# Patient Record
Sex: FEMALE | Race: WHITE | Employment: FULL TIME | URBAN - METROPOLITAN AREA
[De-identification: names, ages, dates, MRNs, and addresses within clinical notes are randomized per-mention and may not be internally consistent; named-entity substitution may affect disease eponyms.]

---

## 2017-07-03 ENCOUNTER — HOSPITAL ENCOUNTER (OUTPATIENT)
Dept: CT IMAGING | Age: 22
Discharge: HOME | End: 2017-07-03
Attending: FAMILY MEDICINE | Admitting: FAMILY MEDICINE

## 2017-07-03 DIAGNOSIS — R59.9 ENLARGEMENT OF LYMPH NODES: ICD-10-CM

## 2017-07-03 RX ORDER — SODIUM CHLORIDE 0.9 % (FLUSH) 0.9 %
10 SYRINGE (ML) INJECTION 2 TIMES DAILY
Status: DISCONTINUED | OUTPATIENT
Start: 2017-07-03 | End: 2017-07-04 | Stop reason: HOSPADM

## 2017-07-03 RX ADMIN — Medication 10 ML: at 15:33

## 2017-08-15 ENCOUNTER — HOSPITAL ENCOUNTER (OUTPATIENT)
Dept: LAB | Age: 22
Discharge: HOME | End: 2017-08-15
Attending: OTOLARYNGOLOGY | Admitting: OTOLARYNGOLOGY

## 2017-08-15 LAB
BASOPHILS ABSOLUTE: 0 K/CU MM
BASOPHILS RELATIVE PERCENT: 0.6 % (ref 0–1)
DIFFERENTIAL TYPE: ABNORMAL
EOSINOPHILS ABSOLUTE: 0.1 K/CU MM
EOSINOPHILS RELATIVE PERCENT: 1.8 % (ref 0–3)
HCT VFR BLD CALC: 42 % (ref 37–47)
HEMOGLOBIN: 13.9 GM/DL (ref 12.5–16)
HETEROPHILE ANTIBODIES: NEGATIVE
IMMATURE NEUTROPHIL %: 0.1 % (ref 0–0.43)
LYMPHOCYTES ABSOLUTE: 3.5 K/CU MM
LYMPHOCYTES RELATIVE PERCENT: 47.4 % (ref 24–44)
MCH RBC QN AUTO: 30.3 PG (ref 27–31)
MCHC RBC AUTO-ENTMCNC: 33.1 % (ref 32–36)
MCV RBC AUTO: 91.7 FL (ref 78–100)
MONOCYTES ABSOLUTE: 0.5 K/CU MM
MONOCYTES RELATIVE PERCENT: 6.8 % (ref 0–4)
PDW BLD-RTO: 12.5 % (ref 11.7–14.9)
PLATELET # BLD: 157 K/CU MM (ref 140–440)
PMV BLD AUTO: 9.4 FL (ref 7.5–11.1)
RBC # BLD: 4.58 M/CU MM (ref 4.2–5.4)
SEGMENTED NEUTROPHILS ABSOLUTE COUNT: 3.1 K/CU MM
SEGMENTED NEUTROPHILS RELATIVE PERCENT: 43.3 % (ref 36–66)
TOTAL IMMATURE NEUTOROPHIL: 0.01 K/CU MM
WBC # BLD: 7.2 K/CU MM (ref 4–10.5)
WBC # BLD: ABNORMAL 10*3/UL

## 2017-08-17 LAB
CMV IGM: 105
CYTOMEGALOVIRUS IGG ANTIBODY: 0.35

## 2019-03-28 ENCOUNTER — ANESTHESIA EVENT (OUTPATIENT)
Dept: LABOR AND DELIVERY | Age: 24
End: 2019-03-28
Payer: COMMERCIAL

## 2019-03-28 ENCOUNTER — ANESTHESIA (OUTPATIENT)
Dept: LABOR AND DELIVERY | Age: 24
End: 2019-03-28
Payer: COMMERCIAL

## 2019-03-28 ENCOUNTER — HOSPITAL ENCOUNTER (INPATIENT)
Age: 24
LOS: 2 days | Discharge: HOME | End: 2019-03-30
Attending: OBSTETRICS & GYNECOLOGY | Admitting: OBSTETRICS & GYNECOLOGY
Payer: COMMERCIAL

## 2019-03-28 PROBLEM — Z34.83 NORMAL PREGNANCY IN MULTIGRAVIDA IN THIRD TRIMESTER: Status: ACTIVE | Noted: 2019-03-28

## 2019-03-28 PROBLEM — Z34.90 TERM PREGNANCY: Status: ACTIVE | Noted: 2019-03-28

## 2019-03-28 LAB
ABO/RH: NORMAL
AMPHETAMINES: NEGATIVE
ANTIBODY SCREEN: NEGATIVE
BACTERIA: ABNORMAL /HPF
BARBITURATE SCREEN URINE: NEGATIVE
BENZODIAZEPINE SCREEN, URINE: NEGATIVE
BILIRUBIN URINE: NEGATIVE MG/DL
BLOOD, URINE: NEGATIVE
CANNABINOID SCREEN URINE: NEGATIVE
CLARITY: ABNORMAL
COCAINE METABOLITE: NEGATIVE
COLOR: YELLOW
GLUCOSE, URINE: NEGATIVE MG/DL
HCT VFR BLD CALC: 36.1 % (ref 37–47)
HEMOGLOBIN: 11.3 GM/DL (ref 12.5–16)
KETONES, URINE: NEGATIVE MG/DL
LEUKOCYTE ESTERASE, URINE: NEGATIVE
MCH RBC QN AUTO: 28.7 PG (ref 27–31)
MCHC RBC AUTO-ENTMCNC: 31.3 % (ref 32–36)
MCV RBC AUTO: 91.6 FL (ref 78–100)
MUCUS: ABNORMAL HPF
NITRITE URINE, QUANTITATIVE: NEGATIVE
OPIATES, URINE: NEGATIVE
OXYCODONE: NORMAL
PDW BLD-RTO: 12.9 % (ref 11.7–14.9)
PH, URINE: 5 (ref 5–8)
PHENCYCLIDINE, URINE: NEGATIVE
PLATELET # BLD: 224 K/CU MM (ref 140–440)
PMV BLD AUTO: 10 FL (ref 7.5–11.1)
PROTEIN UA: 30 MG/DL
RBC # BLD: 3.94 M/CU MM (ref 4.2–5.4)
RBC URINE: 1 /HPF (ref 0–6)
SPECIFIC GRAVITY UA: 1.02 (ref 1–1.03)
SQUAMOUS EPITHELIAL: 51 /HPF
TRICHOMONAS: ABNORMAL /HPF
UROBILINOGEN, URINE: 1 MG/DL (ref 0.2–1)
WBC # BLD: 9.5 K/CU MM (ref 4–10.5)
WBC UA: 1 /HPF (ref 0–5)

## 2019-03-28 PROCEDURE — 6360000002 HC RX W HCPCS: Performed by: ANESTHESIOLOGY

## 2019-03-28 PROCEDURE — 86850 RBC ANTIBODY SCREEN: CPT

## 2019-03-28 PROCEDURE — 3700000025 EPIDURAL BLOCK: Performed by: NURSE ANESTHETIST, CERTIFIED REGISTERED

## 2019-03-28 PROCEDURE — 2580000003 HC RX 258: Performed by: OBSTETRICS & GYNECOLOGY

## 2019-03-28 PROCEDURE — 3E033VJ INTRODUCTION OF OTHER HORMONE INTO PERIPHERAL VEIN, PERCUTANEOUS APPROACH: ICD-10-PCS | Performed by: OBSTETRICS & GYNECOLOGY

## 2019-03-28 PROCEDURE — 10907ZC DRAINAGE OF AMNIOTIC FLUID, THERAPEUTIC FROM PRODUCTS OF CONCEPTION, VIA NATURAL OR ARTIFICIAL OPENING: ICD-10-PCS | Performed by: OBSTETRICS & GYNECOLOGY

## 2019-03-28 PROCEDURE — 86901 BLOOD TYPING SEROLOGIC RH(D): CPT

## 2019-03-28 PROCEDURE — 6370000000 HC RX 637 (ALT 250 FOR IP): Performed by: OBSTETRICS & GYNECOLOGY

## 2019-03-28 PROCEDURE — 0HQ9XZZ REPAIR PERINEUM SKIN, EXTERNAL APPROACH: ICD-10-PCS | Performed by: OBSTETRICS & GYNECOLOGY

## 2019-03-28 PROCEDURE — 7200000001 HC VAGINAL DELIVERY

## 2019-03-28 PROCEDURE — 6360000002 HC RX W HCPCS

## 2019-03-28 PROCEDURE — 1220000000 HC SEMI PRIVATE OB R&B

## 2019-03-28 PROCEDURE — 51702 INSERT TEMP BLADDER CATH: CPT

## 2019-03-28 PROCEDURE — 81001 URINALYSIS AUTO W/SCOPE: CPT

## 2019-03-28 PROCEDURE — 80307 DRUG TEST PRSMV CHEM ANLYZR: CPT

## 2019-03-28 PROCEDURE — 85027 COMPLETE CBC AUTOMATED: CPT

## 2019-03-28 PROCEDURE — 6360000002 HC RX W HCPCS: Performed by: NURSE ANESTHETIST, CERTIFIED REGISTERED

## 2019-03-28 PROCEDURE — 2580000003 HC RX 258

## 2019-03-28 PROCEDURE — 2500000003 HC RX 250 WO HCPCS: Performed by: NURSE ANESTHETIST, CERTIFIED REGISTERED

## 2019-03-28 PROCEDURE — 86900 BLOOD TYPING SEROLOGIC ABO: CPT

## 2019-03-28 RX ORDER — SODIUM CHLORIDE 0.9 % (FLUSH) 0.9 %
10 SYRINGE (ML) INJECTION EVERY 12 HOURS SCHEDULED
Status: DISCONTINUED | OUTPATIENT
Start: 2019-03-28 | End: 2019-03-30 | Stop reason: HOSPADM

## 2019-03-28 RX ORDER — SODIUM CHLORIDE, SODIUM LACTATE, POTASSIUM CHLORIDE, CALCIUM CHLORIDE 600; 310; 30; 20 MG/100ML; MG/100ML; MG/100ML; MG/100ML
INJECTION, SOLUTION INTRAVENOUS CONTINUOUS
Status: DISCONTINUED | OUTPATIENT
Start: 2019-03-28 | End: 2019-03-28

## 2019-03-28 RX ORDER — ROPIVACAINE HYDROCHLORIDE 2 MG/ML
INJECTION, SOLUTION EPIDURAL; INFILTRATION; PERINEURAL CONTINUOUS PRN
Status: DISCONTINUED | OUTPATIENT
Start: 2019-03-28 | End: 2019-03-28 | Stop reason: SDUPTHER

## 2019-03-28 RX ORDER — SODIUM CHLORIDE 0.9 % (FLUSH) 0.9 %
10 SYRINGE (ML) INJECTION PRN
Status: DISCONTINUED | OUTPATIENT
Start: 2019-03-28 | End: 2019-03-28 | Stop reason: HOSPADM

## 2019-03-28 RX ORDER — HYDROCODONE BITARTRATE AND ACETAMINOPHEN 5; 325 MG/1; MG/1
1 TABLET ORAL EVERY 4 HOURS PRN
Status: DISCONTINUED | OUTPATIENT
Start: 2019-03-28 | End: 2019-03-30 | Stop reason: HOSPADM

## 2019-03-28 RX ORDER — IBUPROFEN 800 MG/1
800 TABLET ORAL EVERY 8 HOURS
Status: DISCONTINUED | OUTPATIENT
Start: 2019-03-28 | End: 2019-03-30 | Stop reason: HOSPADM

## 2019-03-28 RX ORDER — HYDROCODONE BITARTRATE AND ACETAMINOPHEN 5; 325 MG/1; MG/1
2 TABLET ORAL EVERY 4 HOURS PRN
Status: DISCONTINUED | OUTPATIENT
Start: 2019-03-28 | End: 2019-03-30 | Stop reason: HOSPADM

## 2019-03-28 RX ORDER — ACETAMINOPHEN 325 MG/1
650 TABLET ORAL EVERY 4 HOURS PRN
Status: DISCONTINUED | OUTPATIENT
Start: 2019-03-28 | End: 2019-03-30 | Stop reason: HOSPADM

## 2019-03-28 RX ORDER — ONDANSETRON 2 MG/ML
4 INJECTION INTRAMUSCULAR; INTRAVENOUS EVERY 6 HOURS PRN
Status: DISCONTINUED | OUTPATIENT
Start: 2019-03-28 | End: 2019-03-30 | Stop reason: HOSPADM

## 2019-03-28 RX ORDER — SODIUM CHLORIDE, SODIUM LACTATE, POTASSIUM CHLORIDE, CALCIUM CHLORIDE 600; 310; 30; 20 MG/100ML; MG/100ML; MG/100ML; MG/100ML
INJECTION, SOLUTION INTRAVENOUS
Status: COMPLETED
Start: 2019-03-28 | End: 2019-03-28

## 2019-03-28 RX ORDER — LANOLIN 100 %
OINTMENT (GRAM) TOPICAL PRN
Status: DISCONTINUED | OUTPATIENT
Start: 2019-03-28 | End: 2019-03-30 | Stop reason: HOSPADM

## 2019-03-28 RX ORDER — ONDANSETRON 2 MG/ML
4 INJECTION INTRAMUSCULAR; INTRAVENOUS EVERY 6 HOURS PRN
Status: DISCONTINUED | OUTPATIENT
Start: 2019-03-28 | End: 2019-03-28 | Stop reason: HOSPADM

## 2019-03-28 RX ORDER — ONDANSETRON 4 MG/1
4 TABLET, ORALLY DISINTEGRATING ORAL EVERY 8 HOURS PRN
Status: DISCONTINUED | OUTPATIENT
Start: 2019-03-28 | End: 2019-03-30 | Stop reason: HOSPADM

## 2019-03-28 RX ORDER — FAMOTIDINE 20 MG/1
20 TABLET, FILM COATED ORAL 2 TIMES DAILY PRN
Status: DISCONTINUED | OUTPATIENT
Start: 2019-03-28 | End: 2019-03-30 | Stop reason: HOSPADM

## 2019-03-28 RX ORDER — DOCUSATE SODIUM 100 MG/1
100 CAPSULE, LIQUID FILLED ORAL 2 TIMES DAILY
Status: DISCONTINUED | OUTPATIENT
Start: 2019-03-29 | End: 2019-03-30 | Stop reason: HOSPADM

## 2019-03-28 RX ORDER — LIDOCAINE HYDROCHLORIDE AND EPINEPHRINE 15; 5 MG/ML; UG/ML
INJECTION, SOLUTION EPIDURAL PRN
Status: DISCONTINUED | OUTPATIENT
Start: 2019-03-28 | End: 2019-03-28 | Stop reason: SDUPTHER

## 2019-03-28 RX ORDER — SODIUM CHLORIDE 0.9 % (FLUSH) 0.9 %
10 SYRINGE (ML) INJECTION PRN
Status: DISCONTINUED | OUTPATIENT
Start: 2019-03-28 | End: 2019-03-30 | Stop reason: HOSPADM

## 2019-03-28 RX ORDER — NALOXONE HYDROCHLORIDE 0.4 MG/ML
0.4 INJECTION, SOLUTION INTRAMUSCULAR; INTRAVENOUS; SUBCUTANEOUS PRN
Status: DISCONTINUED | OUTPATIENT
Start: 2019-03-28 | End: 2019-03-28

## 2019-03-28 RX ORDER — ROPIVACAINE HYDROCHLORIDE 2 MG/ML
INJECTION, SOLUTION EPIDURAL; INFILTRATION; PERINEURAL PRN
Status: DISCONTINUED | OUTPATIENT
Start: 2019-03-28 | End: 2019-03-28 | Stop reason: SDUPTHER

## 2019-03-28 RX ORDER — FENTANYL CITRATE 50 UG/ML
100 INJECTION, SOLUTION INTRAMUSCULAR; INTRAVENOUS
Status: DISCONTINUED | OUTPATIENT
Start: 2019-03-28 | End: 2019-03-28 | Stop reason: HOSPADM

## 2019-03-28 RX ORDER — SODIUM CHLORIDE 0.9 % (FLUSH) 0.9 %
10 SYRINGE (ML) INJECTION EVERY 12 HOURS SCHEDULED
Status: DISCONTINUED | OUTPATIENT
Start: 2019-03-28 | End: 2019-03-28

## 2019-03-28 RX ORDER — ROPIVACAINE HYDROCHLORIDE 2 MG/ML
10 INJECTION, SOLUTION EPIDURAL; INFILTRATION; PERINEURAL CONTINUOUS
Status: DISCONTINUED | OUTPATIENT
Start: 2019-03-28 | End: 2019-03-28

## 2019-03-28 RX ORDER — TERBUTALINE SULFATE 1 MG/ML
0.25 INJECTION, SOLUTION SUBCUTANEOUS ONCE
Status: DISCONTINUED | OUTPATIENT
Start: 2019-03-28 | End: 2019-03-28 | Stop reason: HOSPADM

## 2019-03-28 RX ADMIN — ROPIVACAINE HYDROCHLORIDE 10 ML/HR: 2 INJECTION, SOLUTION EPIDURAL; INFILTRATION at 15:32

## 2019-03-28 RX ADMIN — Medication 1 MILLI-UNITS/MIN: at 10:07

## 2019-03-28 RX ADMIN — ROPIVACAINE HYDROCHLORIDE 10 ML/HR: 2 INJECTION, SOLUTION EPIDURAL; INFILTRATION at 15:36

## 2019-03-28 RX ADMIN — IBUPROFEN 800 MG: 800 TABLET ORAL at 19:30

## 2019-03-28 RX ADMIN — SODIUM CHLORIDE, POTASSIUM CHLORIDE, SODIUM LACTATE AND CALCIUM CHLORIDE: 600; 310; 30; 20 INJECTION, SOLUTION INTRAVENOUS at 09:47

## 2019-03-28 RX ADMIN — LIDOCAINE HYDROCHLORIDE,EPINEPHRINE BITARTRATE 3 ML: 15; .005 INJECTION, SOLUTION EPIDURAL; INFILTRATION; INTRACAUDAL; PERINEURAL at 15:26

## 2019-03-28 RX ADMIN — ROPIVACAINE HYDROCHLORIDE 8 ML: 2 INJECTION, SOLUTION EPIDURAL; INFILTRATION at 15:29

## 2019-03-28 RX ADMIN — SODIUM CHLORIDE, POTASSIUM CHLORIDE, SODIUM LACTATE AND CALCIUM CHLORIDE: 600; 310; 30; 20 INJECTION, SOLUTION INTRAVENOUS at 15:22

## 2019-03-28 ASSESSMENT — PAIN DESCRIPTION - DESCRIPTORS
DESCRIPTORS: CRAMPING
DESCRIPTORS: PRESSURE
DESCRIPTORS: PRESSURE
DESCRIPTORS: CRAMPING

## 2019-03-28 ASSESSMENT — PAIN SCALES - GENERAL
PAINLEVEL_OUTOF10: 6
PAINLEVEL_OUTOF10: 0

## 2019-03-28 NOTE — L&D DELIVERY NOTE
Mother's Information    Labor Events    Rupture type:  Artificial=AROM  Fluid color:  Bloody Show  Fluid odor:  None     Mother Delivery Information    Surgical or Additional Est. Blood Loss (mL):  0 (View Only):  Edit in Flowsheets   Combined Est. Blood Loss (mL):  0        Hild, Baby Pending  [employer] [[id number]]    Labor Events    Cervical ripening date/time:     Rupture date/time: 3/28/19 16:12:00   Rupture type:  Artificial=AROM  Fluid color:  Bloody Show          Labor Event Times    Labor onset date/time:     Dilation complete date/time:   3/28/19 164 EDT   Start pushing:    Decision time (emergent ):       Delivery Providers    Delivering clinician:        Measurements       Delivery Information    Surgical or additional est. blood loss (mL):  0 (View Only):  Edit in Flowsheets   Combined est. blood loss (mL):  0     Vaginal Delivery Counts    Initial count personnel:  Oneal Sweeney RN  Initial count verified by:  JIGNA PHILLIPS RN   4x4:   Needles:   Instruments:   Lap Pads:   Sponges:     Initial counts:          Final counts:                  Department of Obstetrics and Gynecology  Spontaneous Vaginal Delivery Note    Labor & Delivery Summary  Dilation Complete Date: 19  Dilation Complete Time:     Pre-operative Diagnosis:  Term pregnancy    Post-operative Diagnosis:  Same + live male wt8 #,14 oz  Procedure:  Spontaneous vaginal delivery    Surgeon:  [address] [place] [address] for the patient's :  Fe Baby Pending  [employer] [[id number]]          Anesthesia:  epidural anesthesia    Estimated blood loss:  250 mL    Specimen:  Placenta not sent to pathology     Cord blood sent No    Complications:  none    Condition:  infant stable to general nursery and mother stable    Details of Procedure:    The patient is a 25 y.o. female at 38w11d   OB History        2    Para   1    Term   1       0    AB   0    Living   1       SAB   0    TAB   0    Ectopic   0    Molar Multiple   0    Live Births   1             who was admitted for induction. She received the following interventions: ARBOW and IV Pitocin induction She was known to be GBS negative and did not receive antibiotic prophylaxis. The patient progressed well,did receive an epidural, became complete and started to push. After pushing for 40 misn the fetal head was at the perineum, nose and mouth suctioned with bulb suction and the rest of the infant delivered atraumatically, placed on mother abdomen. Cord was clamped and cut and infant handed off to the waiting nurse for evaluation. The delivery of the placenta was spontaneous. The perineum and vagina were explored and a second degree laceration was repaired in standard fashion. Infant's name is Andrew Bloom.

## 2019-03-28 NOTE — H&P
Department of Obstetrics and Gynecology   Obstetrics History and Physical        CHIEF COMPLAINT:   Chief Complaint   Patient presents with    Scheduled Induction         HISTORY OF PRESENT ILLNESS:      The patient is a 25 y.o. female at 38w11d. OB History        2    Para   1    Term   1       0    AB   0    Living   1       SAB   0    TAB   0    Ectopic   0    Molar        Multiple   0    Live Births   1            Patient presents with a chief complaint as above and is being admitted for induction    Estimated Due Date: Estimated Date of Delivery: 3/30/19    PRENATAL CARE:    Complicated by: PUPPS    PAST OB HISTORY  OB History        2    Para   1    Term   1       0    AB   0    Living   1       SAB   0    TAB   0    Ectopic   0    Molar        Multiple   0    Live Births   1                Past Medical History:        Diagnosis Date    Asthma     Hypertension     pregnancy induced    Migraines      Past Surgical History:        Procedure Laterality Date    TONSILLECTOMY      TONSILLECTOMY       Allergies:  Patient has no known allergies.   Social History:    Social History     Socioeconomic History    Marital status:      Spouse name: Not on file    Number of children: Not on file    Years of education: Not on file    Highest education level: Not on file   Occupational History    Not on file   Social Needs    Financial resource strain: Not on file    Food insecurity:     Worry: Not on file     Inability: Not on file    Transportation needs:     Medical: Not on file     Non-medical: Not on file   Tobacco Use    Smoking status: Never Smoker   Substance and Sexual Activity    Alcohol use: No    Drug use: No    Sexual activity: Yes     Partners: Male   Lifestyle    Physical activity:     Days per week: Not on file     Minutes per session: Not on file    Stress: Not on file   Relationships    Social connections:     Talks on phone: Not on file     Gets together: Not on file     Attends Anabaptist service: Not on file     Active member of club or organization: Not on file     Attends meetings of clubs or organizations: Not on file     Relationship status: Not on file    Intimate partner violence:     Fear of current or ex partner: Not on file     Emotionally abused: Not on file     Physically abused: Not on file     Forced sexual activity: Not on file   Other Topics Concern    Not on file   Social History Narrative    Not on file     Family History:       Problem Relation Age of Onset    Birth Defects Brother     Heart Disease Maternal Grandfather     High Blood Pressure Mother     High Cholesterol Mother    Alyssa Hurtado / Amira Mother     Cancer Maternal Grandmother     Miscarriages / Stillbirths Maternal Grandmother      Medications Prior to Admission:  Medications Prior to Admission: ibuprofen (ADVIL;MOTRIN) 800 MG tablet, Take 1 tablet by mouth every 8 hours  furosemide (LASIX) 20 MG tablet, Take 1 tablet by mouth daily as needed  Prenatal Vit-DSS-Fe Cbn-FA (PRENATAL AD) TABS, Take by mouth  FOLIC ACID PO, Take by mouth    REVIEW OF SYSTEMS:    CONSTITUTIONAL:  negative  RESPIRATORY:  negative  CARDIOVASCULAR:  negative  GASTROINTESTINAL:  negative  ALLERGIC/IMMUNOLOGIC:  negative  NEUROLOGICAL:  negative  BEHAVIOR/PSYCH:  negative    PHYSICAL EXAM:  Blood pressure 129/75, pulse 78, temperature 96.9 °F (36.1 °C), temperature source Temporal, resp. rate 16, unknown if currently breastfeeding. General appearance:  awake, alert, cooperative, no apparent distress, and appears stated age  Neurologic:  Awake, alert, oriented to name, place and time.     Lungs:  No increased work of breathing, good air exchange  Abdomen:  Soft, non tender, gravid, consistent with her gestational age,   Fetal heart rate:    Baseline Heart Rate:  145        Accelerations:  absent       Long Term Variability:  moderate       Decelerations:  absent       Pelvis:  Adequate pelvis  Cervix: 3/50/-2 in office yesterday    Contraction frequency:  Not cici    Membranes:  Intact    ASSESSMENT AND PLAN:    Labor: Admit, anticipate normal delivery, routine labor orders  Fetus: Reassuring  GBS:negative  Other: pitocin, epidural prn. Will likely AROM for augmentation of labor.      Benjamin Jean

## 2019-03-29 PROCEDURE — 90715 TDAP VACCINE 7 YRS/> IM: CPT | Performed by: OBSTETRICS & GYNECOLOGY

## 2019-03-29 PROCEDURE — 6360000002 HC RX W HCPCS: Performed by: OBSTETRICS & GYNECOLOGY

## 2019-03-29 PROCEDURE — 6370000000 HC RX 637 (ALT 250 FOR IP): Performed by: OBSTETRICS & GYNECOLOGY

## 2019-03-29 PROCEDURE — 1220000000 HC SEMI PRIVATE OB R&B

## 2019-03-29 PROCEDURE — 90471 IMMUNIZATION ADMIN: CPT | Performed by: OBSTETRICS & GYNECOLOGY

## 2019-03-29 RX ADMIN — TETANUS TOXOID, REDUCED DIPHTHERIA TOXOID AND ACELLULAR PERTUSSIS VACCINE, ADSORBED 0.5 ML: 5; 2.5; 8; 8; 2.5 SUSPENSION INTRAMUSCULAR at 08:21

## 2019-03-29 RX ADMIN — DOCUSATE SODIUM 100 MG: 100 CAPSULE, LIQUID FILLED ORAL at 21:22

## 2019-03-29 RX ADMIN — IBUPROFEN 800 MG: 800 TABLET ORAL at 21:22

## 2019-03-29 RX ADMIN — DOCUSATE SODIUM 100 MG: 100 CAPSULE, LIQUID FILLED ORAL at 08:18

## 2019-03-29 RX ADMIN — IBUPROFEN 800 MG: 800 TABLET ORAL at 13:08

## 2019-03-29 RX ADMIN — IBUPROFEN 800 MG: 800 TABLET ORAL at 04:52

## 2019-03-29 ASSESSMENT — PAIN SCALES - GENERAL
PAINLEVEL_OUTOF10: 4
PAINLEVEL_OUTOF10: 2
PAINLEVEL_OUTOF10: 0
PAINLEVEL_OUTOF10: 0

## 2019-03-29 NOTE — PROGRESS NOTES
Patient up to bathroom with assistance. Nursing assistance. Gait steady. Patient reports left leg weakness. Patient unable to void at this time. Marissa care complete. Patient into wheelchair.

## 2019-03-29 NOTE — PROGRESS NOTES
Patient transferred to [place] [address]. Oriented to room and call light. Instructed not to get out of bed with out nursing assistance. Patient voices understanding and agreement.

## 2019-03-29 NOTE — LACTATION NOTE
Visited, Mom is breast feeding at present. Good positioning and suckling observed. Mom says she didn't have much milk with her first baby. Colostrum vs mature milk volumes discussed. I encourage her to awaken the baby and breast feed frequently, explaining that frequent nursing is important for optimal stimulation to build and maintain a good milk supply. Mom voices understanding. Mom has a new breast pump and says she knows how to use it. Milk storage guidelines discussed. Baby has nursed at the right breast, I offer to assist to continue the feeding, but Mom says she wants to eat her breakfast. Mom is encouraged to call anytime for assistance or concerns.  Bruce Cruz

## 2019-03-29 NOTE — PROGRESS NOTES
Subjective:     Postpartum Day 1:   The patient feels well. The patient denies emotional concerns. Pain is well controlled with current medications. The baby iswell. The patient is ambulating well. The patient is tolerating a normal diet. Objective:        Vitals:    03/29/19 0548   BP: 130/78   Pulse: 69   Resp: 16   Temp: 98.6 °F (37 °C)       Lab Results   Component Value Date    WBC 9.5 03/28/2019    HGB 11.3 (L) 03/28/2019    HCT 36.1 (L) 03/28/2019    MCV 91.6 03/28/2019     03/28/2019       General:    alert, appears stated age and cooperative       Lochia:  appropriate   Uterine    firm       DVT Evaluation:  No evidence of DVT seen on physical exam.     Assessment:     Postpartum day 1 Doing well post delivery. Plan:     Continue current care.     Wendy Llamas [facility] 3/29/2019 11:19 AM

## 2019-03-30 VITALS
TEMPERATURE: 98.2 F | DIASTOLIC BLOOD PRESSURE: 73 MMHG | RESPIRATION RATE: 16 BRPM | HEIGHT: 66 IN | HEART RATE: 71 BPM | WEIGHT: 182 LBS | SYSTOLIC BLOOD PRESSURE: 108 MMHG | BODY MASS INDEX: 29.25 KG/M2 | OXYGEN SATURATION: 98 %

## 2019-03-30 PROBLEM — Z34.83 NORMAL PREGNANCY IN MULTIGRAVIDA IN THIRD TRIMESTER: Status: RESOLVED | Noted: 2019-03-28 | Resolved: 2019-03-30

## 2019-03-30 PROBLEM — Z34.90 TERM PREGNANCY: Status: RESOLVED | Noted: 2019-03-28 | Resolved: 2019-03-30

## 2019-03-30 PROCEDURE — 6370000000 HC RX 637 (ALT 250 FOR IP): Performed by: OBSTETRICS & GYNECOLOGY

## 2019-03-30 RX ORDER — IBUPROFEN 800 MG/1
800 TABLET ORAL EVERY 8 HOURS
Qty: 120 TABLET | Refills: 3 | Status: SHIPPED | OUTPATIENT
Start: 2019-03-30

## 2019-03-30 RX ADMIN — IBUPROFEN 800 MG: 800 TABLET ORAL at 05:53

## 2019-03-30 RX ADMIN — IBUPROFEN 800 MG: 800 TABLET ORAL at 14:33

## 2019-03-30 RX ADMIN — DOCUSATE SODIUM 100 MG: 100 CAPSULE, LIQUID FILLED ORAL at 08:20

## 2019-03-30 ASSESSMENT — PAIN SCALES - GENERAL
PAINLEVEL_OUTOF10: 0
PAINLEVEL_OUTOF10: 0

## 2019-03-30 NOTE — PLAN OF CARE
Problem: VAGINAL DELIVERY - RECOVERY AND POST PARTUM  Goal: Vital signs are medically acceptable  Outcome: Met This Shift  Goal: Patient will remain free of falls  Outcome: Met This Shift  Goal: Fundus firm at midline  Outcome: Met This Shift  Goal: Moderate rubra without clots, no purulent discharge, no foul smelling lochia  Outcome: Met This Shift  Goal: Empties bladder  Outcome: Met This Shift  Goal: Verbalizes understanding of normal bowel function resumption  Outcome: Met This Shift  Goal: Edema will be absent or minimal  Outcome: Met This Shift  Goal: Breasts are soft with nipple integrity intact  Outcome: Met This Shift  Goal: Demonstrates appropriate breast feeding techniques  Outcome: Met This Shift  Goal: Appropriate behavior observed  Outcome: Met This Shift  Goal: Positive Mother-Baby interactions are observed  Outcome: Met This Shift  Goal: Perineum intact without discharge or hematoma  Outcome: Met This Shift  Goal: Ambulates independently  Outcome: Met This Shift     Problem: PAIN  Goal: Patient's pain/discomfort is manageable  Outcome: Met This Shift     Problem: KNOWLEDGE DEFICIT  Goal: Patient/S.O. demonstrates understanding of disease process, treatment plan, medications, and discharge instructions.   Outcome: Met This Shift

## 2019-03-30 NOTE — PROGRESS NOTES
Subjective:     Postpartum Day 2:   The patient feels well. The patient denies emotional concerns. Pain is well controlled with current medications. The baby iswell. The patient is ambulating well. The patient is tolerating a normal diet. Objective:        Vitals:    03/30/19 0553   BP: 125/67   Pulse: 61   Resp: 18   Temp: 96.4 °F (35.8 °C)   SpO2: 98%       Lab Results   Component Value Date    WBC 9.5 03/28/2019    HGB 11.3 (L) 03/28/2019    HCT 36.1 (L) 03/28/2019    MCV 91.6 03/28/2019     03/28/2019       General:    alert, appears stated age and cooperative       Lochia:  appropriate   Uterine    firm       DVT Evaluation:  No evidence of DVT seen on physical exam.     Assessment:     Postpartum day 2 Doing well post delivery. Plan:     Discharge home with standard precautions and return to clinic in 4-6 weeks.     Kb Moe 3/30/2019 9:56 AM

## 2019-03-30 NOTE — DISCHARGE SUMMARY
Obstetrical Discharge Form    Gestational Age:  38w11d    Antepartum complications: none    Date of Delivery:   3/28/19      Type of Delivery:   vaginal, spontaneous    Delivered By:  Jeannette Morejon               Baby:       Information for the patient's :  Jessy Shen [[id number]]        Anesthesia:    Epidural    Intrapartum complications: None    Feeding method:   breast    Postpartum complications: none    Discharge Date:  3/30/19     Condition of discharge:  excellent    Plan:   Follow up    in 6 week(s)

## 2021-12-06 ENCOUNTER — HOSPITAL ENCOUNTER (OUTPATIENT)
Age: 26
Discharge: HOME | End: 2021-12-06
Payer: COMMERCIAL

## 2021-12-06 LAB — HBV SURFACE AB TITR SER: <3.5 {TITER}

## 2021-12-06 PROCEDURE — 86706 HEP B SURFACE ANTIBODY: CPT

## 2021-12-06 PROCEDURE — 36415 COLL VENOUS BLD VENIPUNCTURE: CPT

## 2021-12-06 PROCEDURE — 86762 RUBELLA ANTIBODY: CPT

## 2021-12-06 PROCEDURE — 86765 RUBEOLA ANTIBODY: CPT

## 2021-12-06 PROCEDURE — 86735 MUMPS ANTIBODY: CPT

## 2021-12-06 PROCEDURE — 86787 VARICELLA-ZOSTER ANTIBODY: CPT

## 2021-12-09 LAB
MUV IGG SER QL: <5 AU/ML
RUBELLA ANTIBODY IGG: 34.4 IU/ML
RUBEOLA (MEASLES) AB IGG: 134 AU/ML
VARICELLA-ZOSTER VIRUS AB, IGG: 134.2 IV

## 2021-12-14 LAB
Lab: NORMAL
TEST NAME: NORMAL